# Patient Record
Sex: MALE | Race: WHITE | NOT HISPANIC OR LATINO | URBAN - METROPOLITAN AREA
[De-identification: names, ages, dates, MRNs, and addresses within clinical notes are randomized per-mention and may not be internally consistent; named-entity substitution may affect disease eponyms.]

---
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
## 2024-08-20 ENCOUNTER — HOSPITAL ENCOUNTER (EMERGENCY)
Facility: HOSPITAL | Age: 24
Discharge: HOME | End: 2024-08-20
Attending: EMERGENCY MEDICINE
Payer: COMMERCIAL

## 2024-08-20 VITALS
RESPIRATION RATE: 18 BRPM | TEMPERATURE: 98.6 F | WEIGHT: 238 LBS | BODY MASS INDEX: 29.59 KG/M2 | HEART RATE: 87 BPM | HEIGHT: 75 IN | SYSTOLIC BLOOD PRESSURE: 128 MMHG | DIASTOLIC BLOOD PRESSURE: 82 MMHG | OXYGEN SATURATION: 100 %

## 2024-08-20 DIAGNOSIS — T78.2XXA ANAPHYLAXIS, INITIAL ENCOUNTER: Primary | ICD-10-CM

## 2024-08-20 PROCEDURE — 96372 THER/PROPH/DIAG INJ SC/IM: CPT

## 2024-08-20 PROCEDURE — 25010000002 EPINEPHRINE (ANAPHYLAXIS) 1 MG/ML SOLUTION

## 2024-08-20 PROCEDURE — 99291 CRITICAL CARE FIRST HOUR: CPT

## 2024-08-20 PROCEDURE — 96375 TX/PRO/DX INJ NEW DRUG ADDON: CPT

## 2024-08-20 PROCEDURE — 25010000002 ONDANSETRON PER 1 MG

## 2024-08-20 PROCEDURE — 99285 EMERGENCY DEPT VISIT HI MDM: CPT

## 2024-08-20 PROCEDURE — 25810000003 SODIUM CHLORIDE 0.9 % SOLUTION

## 2024-08-20 PROCEDURE — 96374 THER/PROPH/DIAG INJ IV PUSH: CPT

## 2024-08-20 PROCEDURE — 96361 HYDRATE IV INFUSION ADD-ON: CPT

## 2024-08-20 PROCEDURE — 25010000002 DIPHENHYDRAMINE PER 50 MG

## 2024-08-20 PROCEDURE — 25010000002 METHYLPREDNISOLONE PER 125 MG

## 2024-08-20 RX ORDER — PREDNISONE 10 MG/1
TABLET ORAL
Qty: 21 TABLET | Refills: 0 | Status: SHIPPED | OUTPATIENT
Start: 2024-08-20 | End: 2024-08-26

## 2024-08-20 RX ORDER — DIPHENHYDRAMINE HYDROCHLORIDE 50 MG/ML
50 INJECTION INTRAMUSCULAR; INTRAVENOUS ONCE
Status: COMPLETED | OUTPATIENT
Start: 2024-08-20 | End: 2024-08-20

## 2024-08-20 RX ORDER — ONDANSETRON 2 MG/ML
8 INJECTION INTRAMUSCULAR; INTRAVENOUS ONCE
Status: COMPLETED | OUTPATIENT
Start: 2024-08-20 | End: 2024-08-20

## 2024-08-20 RX ORDER — SODIUM CHLORIDE 0.9 % (FLUSH) 0.9 %
10 SYRINGE (ML) INJECTION AS NEEDED
Status: DISCONTINUED | OUTPATIENT
Start: 2024-08-20 | End: 2024-08-20 | Stop reason: HOSPADM

## 2024-08-20 RX ORDER — EPINEPHRINE 0.3 MG/.3ML
0.3 INJECTION SUBCUTANEOUS ONCE
Qty: 1 EACH | Refills: 0 | Status: SHIPPED | OUTPATIENT
Start: 2024-08-20 | End: 2024-08-20

## 2024-08-20 RX ORDER — FAMOTIDINE 10 MG/ML
20 INJECTION, SOLUTION INTRAVENOUS ONCE
Status: COMPLETED | OUTPATIENT
Start: 2024-08-20 | End: 2024-08-20

## 2024-08-20 RX ORDER — METHYLPREDNISOLONE SODIUM SUCCINATE 125 MG/2ML
125 INJECTION, POWDER, LYOPHILIZED, FOR SOLUTION INTRAMUSCULAR; INTRAVENOUS ONCE
Status: COMPLETED | OUTPATIENT
Start: 2024-08-20 | End: 2024-08-20

## 2024-08-20 RX ORDER — EPINEPHRINE 1 MG/ML
0.3 INJECTION, SOLUTION INTRAMUSCULAR; SUBCUTANEOUS ONCE
Status: COMPLETED | OUTPATIENT
Start: 2024-08-20 | End: 2024-08-20

## 2024-08-20 RX ADMIN — ONDANSETRON 8 MG: 2 INJECTION INTRAMUSCULAR; INTRAVENOUS at 17:02

## 2024-08-20 RX ADMIN — METHYLPREDNISOLONE SODIUM SUCCINATE 125 MG: 125 INJECTION, POWDER, FOR SOLUTION INTRAMUSCULAR; INTRAVENOUS at 16:51

## 2024-08-20 RX ADMIN — FAMOTIDINE 20 MG: 10 INJECTION INTRAVENOUS at 16:50

## 2024-08-20 RX ADMIN — EPINEPHRINE 0.3 MG: 1 INJECTION INTRAMUSCULAR; INTRAVENOUS; SUBCUTANEOUS at 16:51

## 2024-08-20 RX ADMIN — SODIUM CHLORIDE 1000 ML: 9 INJECTION, SOLUTION INTRAVENOUS at 16:51

## 2024-08-20 RX ADMIN — DIPHENHYDRAMINE HYDROCHLORIDE 50 MG: 50 INJECTION, SOLUTION INTRAMUSCULAR; INTRAVENOUS at 16:50

## 2024-08-20 NOTE — DISCHARGE INSTRUCTIONS
Thank you for letting us care for you today.  Take the prednisone as prescribed.  You can also use Benadryl as needed over-the-counter for your symptoms.  I have also sent in a prescription for an EpiPen.  If you do use the EpiPen immediately go to your nearest emergency room.  Please follow-up with your primary care provider.  Avoid any peanuts.  Return to the emergency room for any difficulty swallowing, shortness of breath or any other concerning symptoms.

## 2024-08-20 NOTE — FSED PROVIDER NOTE
[facility] FREE-STANDING ED / URGENT CARE    EMERGENCY DEPARTMENT ENCOUNTER    Room Number:  01/01  Date seen:  8/20/2024  Time seen: 17:32 EDT  PCP: Cielo Anderson MD  Historian: patient    HPI:  Chief complaint:allergic reaction  Context:Faizan Ross is a 24 y.o. male who presents to the ED with c/o allergic reaction.  Patient states that he ate something that contained peanuts which he is allergic to.  Patient is actively dry heaving and reports that he has having difficulty swallowing with shortness of breath.  Patient states that he did not take anything prior to arrival.    Timing: Constant  Duration: Prior to arrival  Intensity/Severity: Severe  Associated Symptoms: Difficulty swallowing, shortness of breath, dry heaving      MEDICAL RECORD REVIEW  No chronic medical history reported    ALLERGIES  Nuts    PAST MEDICAL HISTORY  Active Ambulatory Problems     Diagnosis Date Noted    No Active Ambulatory Problems     Resolved Ambulatory Problems     Diagnosis Date Noted    No Resolved Ambulatory Problems     No Additional Past Medical History       PAST SURGICAL HISTORY  No past surgical history on file.    FAMILY HISTORY  No family history on file.    SOCIAL HISTORY  Social History     Socioeconomic History    Marital status: Single       REVIEW OF SYSTEMS  Review of Systems    All systems reviewed and negative except for those discussed in HPI.     PHYSICAL EXAM    I have reviewed the triage vital signs and nursing notes.    ED Triage Vitals [08/20/24 1647]   Temp Heart Rate Resp BP SpO2   98.6 °F (37 °C) 110 18 154/94 93 %      Temp src Heart Rate Source Patient Position BP Location FiO2 (%)   Oral Monitor Sitting Right arm --       Physical Exam  Constitutional:       General: He is in acute distress.   HENT:      Nose: Nose normal.      Mouth/Throat:      Lips: Pink.      Mouth: Mucous membranes are moist.      Pharynx: Uvula midline. Uvula swelling present.   Eyes:      Pupils: Pupils are  equal, round, and reactive to light.   Cardiovascular:      Rate and Rhythm: Tachycardia present.      Pulses: Normal pulses.      Heart sounds: Normal heart sounds.   Pulmonary:      Effort: Pulmonary effort is normal.      Breath sounds: Normal breath sounds. No wheezing.   Musculoskeletal:         General: Normal range of motion.   Skin:     General: Skin is warm.   Neurological:      General: No focal deficit present.      Mental Status: He is alert.   Psychiatric:         Mood and Affect: Mood normal.         Behavior: Behavior normal.         Vital signs and nursing notes reviewed.        LAB RESULTS  No results found for this or any previous visit (from the past 24 hour(s)).    Ordered the above labs and independently reviewed the results.      RADIOLOGY RESULTS  No Radiology Exams Resulted Within Past 24 Hours       I ordered the above noted radiological studies. Independently reviewed by me and discussed with radiologist.  See dictation above for official radiology interpretation.      Orders placed during this visit:  Orders Placed This Encounter   Procedures    Critical Care    Insert peripheral IV    ED Acknowledgement Form Needed;           PROCEDURES    Critical Care    Performed by: Zohra Callahan APRN  Authorized by: Faizan Choudhury MD    Critical care provider statement:     Critical care time (minutes):  30    Critical care was necessary to treat or prevent imminent or life-threatening deterioration of the following conditions:  Shock    Critical care was time spent personally by me on the following activities:  Development of treatment plan with patient or surrogate, evaluation of patient's response to treatment, examination of patient, review of old charts, re-evaluation of patient's condition, pulse oximetry and ordering and performing treatments and interventions    I assumed direction of critical care for this patient from another provider in my specialty: no            MEDICATIONS  GIVEN IN ER    Medications   sodium chloride 0.9 % flush 10 mL (has no administration in time range)   diphenhydrAMINE (BENADRYL) injection 50 mg (50 mg Intravenous Given 8/20/24 1650)   methylPREDNISolone sodium succinate (SOLU-Medrol) injection 125 mg (125 mg Intravenous Given 8/20/24 1651)   famotidine (PEPCID) injection 20 mg (20 mg Intravenous Given 8/20/24 1650)   sodium chloride 0.9 % bolus 1,000 mL (0 mL Intravenous Stopped 8/20/24 1919)   EPINEPHrine (Anaphylaxis) (ADRENALIN) injection 0.3 mg (0.3 mg Intramuscular Given 8/20/24 1651)   ondansetron (ZOFRAN) injection 8 mg (8 mg Intravenous Given 8/20/24 1702)         PROGRESS, DATA ANALYSIS, CONSULTS, AND MEDICAL DECISION MAKING    All labs and radiology studies have been independently reviewed by me.     ED Course as of 08/20/24 1945 Tue Aug 20, 2024   1732 Dr. Choudhury at bedside to evaluate the patient.  Patient has improved after medications and will be monitored for 2 hours. [KJ]      ED Course User Index  [KJ] Zohra Callahan, PATRICIA       AS OF 19:45 EDT VITALS:    BP - 128/82  HR - 87  TEMP - 98.6 °F (37 °C) (Oral)  02 SATS - 100%    Medical Decision Making  MEDICAL DECISION  Patient is a 24-year-old male who presents today after eating something that contained peanuts which she is allergic to.  Patient had an IV established and was given medications for allergic reaction.  This patient presents with symptoms consistent with acute hypersensitivity reaction, likely acute allergic reaction. Presentation not consistent with acute anaphylaxis (lack of pulmonary, dermatologic, cardiovascular or GI symptoms, lack of hypotension), serum sickness (no recent drug exposure, lacks fevers, arthralgias). No evidence of airway compromise or shock at this time.  Patient had improvement after Benadryl Solu-Medrol Pepcid IV fluids epi and Zofran.  He will be sent home with a prescription for prednisone taper.  I have also sent him home with a prescription for an  EpiPen.  Recommend follow-up with his primary care provider.  He was given strict return precautions with understanding.    Problems Addressed:  Anaphylaxis, initial encounter: complicated acute illness or injury    Risk  Prescription drug management.          DIAGNOSIS  Final diagnoses:   Anaphylaxis, initial encounter       New Medications Ordered This Visit   Medications    sodium chloride 0.9 % flush 10 mL    diphenhydrAMINE (BENADRYL) injection 50 mg    methylPREDNISolone sodium succinate (SOLU-Medrol) injection 125 mg    famotidine (PEPCID) injection 20 mg    sodium chloride 0.9 % bolus 1,000 mL    EPINEPHrine (Anaphylaxis) (ADRENALIN) injection 0.3 mg    ondansetron (ZOFRAN) injection 8 mg    EPINEPHrine (EPIPEN) 0.3 MG/0.3ML solution auto-injector injection     Sig: Inject 0.3 mL under the skin into the appropriate area as directed 1 (One) Time for 1 dose.     Dispense:  1 each     Refill:  0    predniSONE (DELTASONE) 10 MG tablet     Sig: Take 6 tablets by mouth Daily for 1 day, THEN 5 tablets Daily for 1 day, THEN 4 tablets Daily for 1 day, THEN 3 tablets Daily for 1 day, THEN 2 tablets Daily for 1 day, THEN 1 tablet Daily for 1 day.     Dispense:  21 tablet     Refill:  0           I performed hand hygiene on entry into the pt room and upon exit.      Part of this note may be an electronic transcription/translation of spoken language to printed text using the Dragon Dictation System.     Appropriate PPE worn during exam.    Dictated utilizing [employer] dictation     Note Disclaimer: At [facility], we believe that sharing information builds trust and better relationships. You are receiving this note because you recently visited [facility]. It is possible you will see health information before a provider has talked with you about it. This kind of information can be easy to misunderstand. To help you fully understand what it means for your health, we urge you to discuss this note with your provider.

## 2025-03-31 ENCOUNTER — HOSPITAL ENCOUNTER (EMERGENCY)
Facility: HOSPITAL | Age: 25
Discharge: HOME | End: 2025-03-31
Attending: EMERGENCY MEDICINE | Admitting: EMERGENCY MEDICINE
Payer: COMMERCIAL

## 2025-03-31 ENCOUNTER — APPOINTMENT (OUTPATIENT)
Dept: GENERAL RADIOLOGY | Facility: HOSPITAL | Age: 25
End: 2025-03-31
Payer: COMMERCIAL

## 2025-03-31 VITALS
HEART RATE: 60 BPM | SYSTOLIC BLOOD PRESSURE: 135 MMHG | WEIGHT: 230 LBS | BODY MASS INDEX: 28.6 KG/M2 | DIASTOLIC BLOOD PRESSURE: 86 MMHG | OXYGEN SATURATION: 100 % | HEIGHT: 75 IN | RESPIRATION RATE: 16 BRPM | TEMPERATURE: 98 F

## 2025-03-31 DIAGNOSIS — R74.8 ELEVATED LIVER ENZYMES: ICD-10-CM

## 2025-03-31 DIAGNOSIS — T18.128A ESOPHAGEAL OBSTRUCTION DUE TO FOOD IMPACTION: Primary | ICD-10-CM
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
## 2025-03-31 LAB
ALBUMIN SERPL-MCNC: 4.7 G/DL (ref 3.5–5.2)
ALBUMIN/GLOB SERPL: 1.6 G/DL
ALP SERPL-CCNC: 142 U/L (ref 39–117)
ALT SERPL W P-5'-P-CCNC: 148 U/L (ref 1–41)
ANION GAP SERPL CALCULATED.3IONS-SCNC: 10.4 MMOL/L (ref 5–15)
AST SERPL-CCNC: 78 U/L (ref 1–40)
BASOPHILS # BLD AUTO: 0.05 10*3/MM3 (ref 0–0.2)
BASOPHILS NFR BLD AUTO: 0.6 % (ref 0–1.5)
BILIRUB SERPL-MCNC: 0.7 MG/DL (ref 0–1.2)
BUN SERPL-MCNC: 24 MG/DL (ref 6–20)
BUN/CREAT SERPL: 20.9 (ref 7–25)
CALCIUM SPEC-SCNC: 10.1 MG/DL (ref 8.6–10.5)
CHLORIDE SERPL-SCNC: 99 MMOL/L (ref 98–107)
CO2 SERPL-SCNC: 29.6 MMOL/L (ref 22–29)
CREAT SERPL-MCNC: 1.15 MG/DL (ref 0.76–1.27)
DEPRECATED RDW RBC AUTO: 36.4 FL (ref 37–54)
EGFRCR SERPLBLD CKD-EPI 2021: 90.6 ML/MIN/1.73
EOSINOPHIL # BLD AUTO: 0.55 10*3/MM3 (ref 0–0.4)
EOSINOPHIL NFR BLD AUTO: 6.8 % (ref 0.3–6.2)
ERYTHROCYTE [DISTWIDTH] IN BLOOD BY AUTOMATED COUNT: 11.5 % (ref 12.3–15.4)
GLOBULIN UR ELPH-MCNC: 2.9 GM/DL
GLUCOSE SERPL-MCNC: 100 MG/DL (ref 65–99)
HCT VFR BLD AUTO: 45.2 % (ref 37.5–51)
HGB BLD-MCNC: 14.9 G/DL (ref 13–17.7)
IMM GRANULOCYTES # BLD AUTO: 0.01 10*3/MM3 (ref 0–0.05)
IMM GRANULOCYTES NFR BLD AUTO: 0.1 % (ref 0–0.5)
LYMPHOCYTES # BLD AUTO: 2.42 10*3/MM3 (ref 0.7–3.1)
LYMPHOCYTES NFR BLD AUTO: 29.7 % (ref 19.6–45.3)
MCH RBC QN AUTO: 28.9 PG (ref 26.6–33)
MCHC RBC AUTO-ENTMCNC: 33 G/DL (ref 31.5–35.7)
MCV RBC AUTO: 87.6 FL (ref 79–97)
MONOCYTES # BLD AUTO: 0.72 10*3/MM3 (ref 0.1–0.9)
MONOCYTES NFR BLD AUTO: 8.8 % (ref 5–12)
NEUTROPHILS NFR BLD AUTO: 4.39 10*3/MM3 (ref 1.7–7)
NEUTROPHILS NFR BLD AUTO: 54 % (ref 42.7–76)
PLATELET # BLD AUTO: 282 10*3/MM3 (ref 140–450)
PMV BLD AUTO: 10 FL (ref 6–12)
POTASSIUM SERPL-SCNC: 3.7 MMOL/L (ref 3.5–5.2)
PROT SERPL-MCNC: 7.6 G/DL (ref 6–8.5)
RBC # BLD AUTO: 5.16 10*6/MM3 (ref 4.14–5.8)
SODIUM SERPL-SCNC: 139 MMOL/L (ref 136–145)
WBC NRBC COR # BLD AUTO: 8.14 10*3/MM3 (ref 3.4–10.8)

## 2025-03-31 PROCEDURE — 99283 EMERGENCY DEPT VISIT LOW MDM: CPT

## 2025-03-31 PROCEDURE — 80053 COMPREHEN METABOLIC PANEL: CPT | Performed by: EMERGENCY MEDICINE

## 2025-03-31 PROCEDURE — 85025 COMPLETE CBC W/AUTO DIFF WBC: CPT | Performed by: EMERGENCY MEDICINE

## 2025-03-31 PROCEDURE — 96375 TX/PRO/DX INJ NEW DRUG ADDON: CPT

## 2025-03-31 PROCEDURE — 25010000002 GLUCAGON (RDNA) PER 1 MG: Performed by: EMERGENCY MEDICINE

## 2025-03-31 PROCEDURE — 70360 X-RAY EXAM OF NECK: CPT

## 2025-03-31 PROCEDURE — 96374 THER/PROPH/DIAG INJ IV PUSH: CPT

## 2025-03-31 PROCEDURE — 25010000002 ONDANSETRON PER 1 MG: Performed by: EMERGENCY MEDICINE

## 2025-03-31 PROCEDURE — 71046 X-RAY EXAM CHEST 2 VIEWS: CPT

## 2025-03-31 PROCEDURE — 99284 EMERGENCY DEPT VISIT MOD MDM: CPT | Performed by: EMERGENCY MEDICINE

## 2025-03-31 RX ORDER — FAMOTIDINE 20 MG/1
20 TABLET, FILM COATED ORAL NIGHTLY
Qty: 15 TABLET | Refills: 0 | Status: SHIPPED | OUTPATIENT
Start: 2025-03-31

## 2025-03-31 RX ORDER — ONDANSETRON 2 MG/ML
4 INJECTION INTRAMUSCULAR; INTRAVENOUS ONCE
Status: COMPLETED | OUTPATIENT
Start: 2025-03-31 | End: 2025-03-31

## 2025-03-31 RX ORDER — IBUPROFEN 600 MG/1
1 TABLET ORAL ONCE
Status: COMPLETED | OUTPATIENT
Start: 2025-03-31 | End: 2025-03-31

## 2025-03-31 RX ADMIN — Medication 1 MG: at 21:25

## 2025-03-31 RX ADMIN — ONDANSETRON 4 MG: 2 INJECTION, SOLUTION INTRAMUSCULAR; INTRAVENOUS at 21:24

## 2025-04-01 NOTE — FSED PROVIDER NOTE
Subjective   History of Present Illness  25 yom complains of food stuck in his throat. The patient was eating a burrito when he felt it get stuck. He has not been able to tolerate his secretions. He notes this has happened in the past and he had to have his esophagus stretched.       Review of Systems   Constitutional: Negative.    HENT:  Positive for trouble swallowing.    All other systems reviewed and are negative.      History reviewed. No pertinent past medical history.    Allergies   Allergen Reactions    Nuts Shortness Of Breath       History reviewed. No pertinent surgical history.    History reviewed. No pertinent family history.    Social History     Socioeconomic History    Marital status: Single           Objective   Physical Exam  Vitals reviewed.   Constitutional:       General: He is not in acute distress.  HENT:      Head: Normocephalic and atraumatic.      Jaw: There is normal jaw occlusion.      Mouth/Throat:      Mouth: Mucous membranes are moist.      Pharynx: Oropharynx is clear. Uvula midline.   Neck:      Thyroid: No thyroid mass.      Trachea: Trachea and phonation normal.   Cardiovascular:      Rate and Rhythm: Normal rate and regular rhythm.      Pulses: Normal pulses.      Heart sounds: Normal heart sounds.   Pulmonary:      Effort: Pulmonary effort is normal.      Breath sounds: Normal breath sounds.   Abdominal:      Palpations: Abdomen is soft.      Tenderness: There is no abdominal tenderness.   Skin:     General: Skin is warm and dry.   Neurological:      Mental Status: He is alert.         Procedures           ED Course  ED Course as of 04/01/25 0239   Mon Mar 31, 2025   2231 Pt able to tolerate oral fluids in the ER. He is feeling much better and agreeable with discharge at this time.  [BM]      ED Course User Index  [BM] Janice Ayers MD                                           Medical Decision Making  + Globus Sensation  Given history and exam patient's presentation most  consistent with Esophageal Foreign Body.  I have low suspicion for esophageal perforation, impending esophageal necrosis, airway compromise.    Workup: CXR    Interventions:  Antiemetic followed in ~15min by Glucagon 1 mg IV    Treatment intervention successful. Pt able to tolerate oral fluids and feels better  Disposition: Discharge home    Problems Addressed:  Elevated liver enzymes: complicated acute illness or injury  Esophageal obstruction due to food impaction: complicated acute illness or injury    Amount and/or Complexity of Data Reviewed  Labs: ordered.  Radiology: ordered.    Risk  Prescription drug management.        Final diagnoses:   Esophageal obstruction due to food impaction   Elevated liver enzymes       ED Disposition  ED Disposition       ED Disposition   Discharge    Condition   Stable    Comment   --               Cielo Anderson MD  [address]  [place] [address] [phone]    Schedule an appointment as soon as possible for a visit on 4/4/2025  re-evaluation    [facility] [address] [address] [phone]  Schedule an appointment as soon as possible for a visit   re-evaluation         Medication List        New Prescriptions      famotidine 20 MG tablet  Commonly known as: PEPCID  Take 1 tablet by mouth Every Night.               Where to Get Your Medications        These medications were sent to [facility]/pharmacy #[id number] - [place], [place] - [address] [address] - [phone]  - [phone]   [address] [place] [address] [place] [address]      Phone: [phone]   famotidine 20 MG tablet

## 2025-04-01 NOTE — DISCHARGE INSTRUCTIONS
Follow-up with your Doctor for re-evaluation this week. Drink plenty of fluids. Eat small bite size pieces. Return if problems.

## 2025-07-03 ENCOUNTER — HOSPITAL ENCOUNTER (EMERGENCY)
Facility: HOSPITAL | Age: 25
Discharge: HOME | End: 2025-07-03
Attending: EMERGENCY MEDICINE
Payer: COMMERCIAL

## 2025-07-03 ENCOUNTER — APPOINTMENT (OUTPATIENT)
Dept: ULTRASOUND IMAGING | Facility: HOSPITAL | Age: 25
End: 2025-07-03
Payer: COMMERCIAL

## 2025-07-03 VITALS
DIASTOLIC BLOOD PRESSURE: 71 MMHG | HEART RATE: 88 BPM | RESPIRATION RATE: 16 BRPM | OXYGEN SATURATION: 98 % | TEMPERATURE: 98.4 F | WEIGHT: 225.7 LBS | BODY MASS INDEX: 28.06 KG/M2 | HEIGHT: 75 IN | SYSTOLIC BLOOD PRESSURE: 146 MMHG

## 2025-07-03 DIAGNOSIS — N48.22 CELLULITIS, PENIS: ICD-10-CM

## 2025-07-03 DIAGNOSIS — N49.2 CELLULITIS, SCROTUM: Primary | ICD-10-CM

## 2025-07-03 LAB
BILIRUB UR QL STRIP: NEGATIVE
CLARITY UR: CLEAR
COLOR UR: YELLOW
GLUCOSE UR STRIP-MCNC: NEGATIVE MG/DL
HGB UR QL STRIP.AUTO: NEGATIVE
KETONES UR QL STRIP: NEGATIVE
LEUKOCYTE ESTERASE UR QL STRIP.AUTO: NEGATIVE
NITRITE UR QL STRIP: NEGATIVE
PH UR STRIP.AUTO: 6.5 [PH] (ref 5–8)
PROT UR QL STRIP: NEGATIVE
SP GR UR STRIP: 1.02 (ref 1–1.03)
UROBILINOGEN UR QL STRIP: NORMAL

## 2025-07-03 PROCEDURE — 81003 URINALYSIS AUTO W/O SCOPE: CPT | Performed by: EMERGENCY MEDICINE

## 2025-07-03 PROCEDURE — 93976 VASCULAR STUDY: CPT

## 2025-07-03 PROCEDURE — 99284 EMERGENCY DEPT VISIT MOD MDM: CPT

## 2025-07-03 PROCEDURE — 99283 EMERGENCY DEPT VISIT LOW MDM: CPT | Performed by: EMERGENCY MEDICINE

## 2025-07-03 PROCEDURE — 76870 US EXAM SCROTUM: CPT

## 2025-07-03 RX ORDER — CEPHALEXIN 500 MG/1
500 CAPSULE ORAL 4 TIMES DAILY
Qty: 40 CAPSULE | Refills: 0 | Status: SHIPPED | OUTPATIENT
Start: 2025-07-03 | End: 2025-07-13

## 2025-07-03 NOTE — DISCHARGE INSTRUCTIONS
The medications as prescribed.  If you have worsening symptoms despite medication please go to a hospital-based emergency room for further evaluation and treatment.

## 2025-07-03 NOTE — FSED PROVIDER NOTE
Subjective   History of Present Illness  25-year-old male presents complaining of scrotal swelling and shaft of penis swelling.  Says it itches a little bit, denies pain denies dysuria denies pain with ejaculation denies concern for STDs, always uses condoms with same partner for 1 year.  Denies testicular pain.  Symptoms been going on for couple of days.  Review of Systems  As noted in HPI  History reviewed. No pertinent past medical history.    Allergies   Allergen Reactions    Nuts Shortness Of Breath       History reviewed. No pertinent surgical history.    History reviewed. No pertinent family history.    Social History     Socioeconomic History    Marital status: Single           Objective   Physical Exam  : There is mild edema of the shaft of the penis slight erythema, there is some thickening of the scrotal skin with slight warmth of the scrotal skin, there is swelling of the scrotum and testicles are nontender  Procedures           ED Course  ED Course as of 07/03/25 1511   Thu Jul 03, 2025   1339 H&P as above, no lesions to concern for herpes or syphilis, he does not have any dysuria or penile discharge, does have some slightly erythematous thickened scrotal skin and some slight swelling of the scrotum versus contraction around the testicles.  Slight swelling of the shaft of the penis but there is no fluctuance there is no significant erythema or induration of the penile tissue.  Pending ultrasound but I am not concerned for torsion, more likely to be enterocele versus varicocele, may have some scrotal cellulitis [RO]   1510 Ultrasound is negative for testicular abnormalities, there is some scrotal wall thickening consistent with cellulitis, will prescribe Keflex and follow-up with urology. [RO]      ED Course User Index  [RO] Skip Vazquez MD                                           Medical Decision Making  Amount and/or Complexity of Data Reviewed  Radiology: ordered.        Final diagnoses:    Cellulitis, scrotum   Cellulitis, penis       ED Disposition  ED Disposition       ED Disposition   Discharge    Condition   Good    Comment   --               [facility] - [place] [address] [address]  [phone]  Call   To schedule follow-up appointment         Medication List        New Prescriptions      cephalexin 500 MG capsule  Commonly known as: KEFLEX  Take 1 capsule by mouth 4 (Four) Times a Day for 10 days.               Where to Get Your Medications        These medications were sent to [facility][id number] - [facility] [place] [address] AT [address] [place] [address] - [phone]  - [phone] [address] [place] [address]      Phone: [phone]   cephalexin 500 MG capsule